# Patient Record
(demographics unavailable — no encounter records)

---

## 2019-10-01 NOTE — ED PHYSICIAN DOCUMENTATION
History of Present Illness





- Stated complaint


Stated Complaint: HBP





- Chief complaint


Chief Complaint: General





- History obtained from


History obtained from: Patient





- History of Present Illness


Timing: How many weeks ago (10)





- Additonal information


Additional information: 





61-year-old male with a prior history of atrial fibrillation and hypertension 

has been off of his medications for close to 3 months.  He yesterday had an 

episode of diaphoresis and felt poorly and he states that previously this was 

his symptoms when his blood pressure was markedly elevated.  He wants to start 

back on his medications.  He was a very poor historian about what medicines he 

was on and what conditions he was being treated for he thought maybe he had been

on a blood thinner at one point and blood pressure medication.  He was uncertain

of names or doses.  He does go to [facility] in [place].  He states that he is 

otherwise not ill and has not been ill recently.  He does have an appointment to

see his doctor in 1 week.





Review of Systems


Constitutional: reports: Sweats.  denies: Fever, Chills


Eyes: denies: Decreased vision


Ears: denies: Ear pain


Nose: denies: Rhinorrhea / runny nose, Congestion


Throat: denies: Sore throat


Cardiac: denies: Chest pain / pressure, Palpitations, Pedal edema, Calf pain


Respiratory: denies: Dyspnea, Cough


GI: denies: Abdominal Pain, Nausea, Vomiting


: denies: Dysuria, Frequency


Skin: denies: Rash


Musculoskeletal: denies: Neck pain, Back pain, Extremity pain


Neurologic: denies: Generalized weakness, Focal weakness, Numbness





PD PAST MEDICAL HISTORY





- Past Medical History


Cardiovascular: Hypertension





- Present Medications


Home Medications: 


                                Ambulatory Orders











 Medication  Instructions  Recorded  Confirmed


 


Lisinopril [Zestril] 25 mg PO 10/01/19 


 


Lisinopril [Zestril] 30 mg PO DAILY #20 tablet 10/01/19 


 


Metoprolol Succinate 25 mg PO 10/01/19 10/01/19


 


Metoprolol Succinate 25 mg PO DAILY #30 tab.er.24h 10/01/19 


 


Rivaroxaban [Xarelto] 20 mg PO DAILY #20 tablet 10/01/19 














- Allergies


Allergies/Adverse Reactions: 


                                    Allergies











Allergy/AdvReac Type Severity Reaction Status Date / Time


 


No Known Drug Allergies Allergy   Verified 10/01/19 16:50














- Social History


Does the pt smoke?: Yes


Smoking Status: Current every day smoker





PD ED PE NORMAL





- Vitals


Vital signs reviewed: Yes (tachy and hypertensive )





- General


General: Alert and oriented X 3, No acute distress, Well developed/nourished





- HEENT


HEENT: Atraumatic, PERRL, EOMI





- Neck


Neck: Supple, no meningeal sign, No bony TTP





- Cardiac


Cardiac: No murmur, Other (irregular rate and rhythm )





- Respiratory


Respiratory: No respiratory distress, Clear bilaterally





- Abdomen


Abdomen: Soft, Non tender





- Back


Back: No CVA TTP, No spinal TTP





- Derm


Derm: Normal color, Warm and dry, No rash





- Extremities


Extremities: No deformity, No edema





- Neuro


Neuro: Alert and oriented X 3, CNs 2-12 intact, No motor deficit, No sensory 

deficit, Normal speech


Eye Opening: Spontaneous


Motor: Obeys Commands


Verbal: Oriented


GCS Score: 15





- Psych


Psych: Normal mood, Normal affect





Results





- Vitals


Vitals: 


                               Vital Signs - 24 hr











  10/01/19 10/01/19





  16:47 17:59


 


Temperature 36 C L 


 


Heart Rate 107 H 102 H


 


Respiratory 20 10 L





Rate  


 


Blood Pressure 133/94 H 161/118 H


 


O2 Saturation 99 97








                                     Oxygen











O2 Source                      Room air

















- Tele (time rhythm occurred)


  ** 1750


Telemetry / rhythm strip: Rate (100), Atrial fibrillation





PD MEDICAL DECISION MAKING





- ED course


Complexity details: reviewed results, re-evaluated patient, considered 

differential, d/w patient


ED course: 





61-year-old male requesting a medication refill arrives to the emergency 

department without symptoms and has mild hypertension and he is in atrial 

fibrillation with a rate of 100.  We contacted the pharmacy and they indicated 

he last filled his Xarelto over a year ago and he last filled his blood pressure

 medications with enough to last until 1 July.  We will restart him on his 

Xarelto lisinopril and metoprolol.





Departure





- Departure


Disposition: 01 Home, Self Care


Clinical Impression: 


Hypertension


Qualifiers:


 Hypertension type: unspecified Qualified Code(s): I10 - Essential (primary) 

hypertension





Atrial fibrillation


Qualifiers:


 Atrial fibrillation type: unspecified Qualified Code(s): I48.91 - Unspecified 

atrial fibrillation





Condition: Stable


Instructions:  ED Afib, ED HTN Established


Follow-Up: 


[facility] [Provider Group]


Prescriptions: 


Lisinopril [Zestril] 30 mg PO DAILY #20 tablet


Metoprolol Succinate 25 mg PO DAILY #30 tab.er.24h


Rivaroxaban [Xarelto] 20 mg PO DAILY #20 tablet


Discharge Date/Time: 10/01/19 18:15